# Patient Record
Sex: FEMALE | Race: WHITE | URBAN - METROPOLITAN AREA
[De-identification: names, ages, dates, MRNs, and addresses within clinical notes are randomized per-mention and may not be internally consistent; named-entity substitution may affect disease eponyms.]

---

## 2021-08-04 ENCOUNTER — OFFICE VISIT (OUTPATIENT)
Dept: URBAN - METROPOLITAN AREA CLINIC 105 | Facility: CLINIC | Age: 56
End: 2021-08-04
Payer: COMMERCIAL

## 2021-08-04 ENCOUNTER — TELEPHONE ENCOUNTER (OUTPATIENT)
Dept: URBAN - METROPOLITAN AREA CLINIC 105 | Facility: CLINIC | Age: 56
End: 2021-08-04

## 2021-08-04 ENCOUNTER — WEB ENCOUNTER (OUTPATIENT)
Dept: URBAN - METROPOLITAN AREA CLINIC 105 | Facility: CLINIC | Age: 56
End: 2021-08-04

## 2021-08-04 VITALS
SYSTOLIC BLOOD PRESSURE: 149 MMHG | HEART RATE: 76 BPM | BODY MASS INDEX: 26.16 KG/M2 | TEMPERATURE: 97.5 F | DIASTOLIC BLOOD PRESSURE: 78 MMHG | HEIGHT: 65 IN | WEIGHT: 157 LBS

## 2021-08-04 DIAGNOSIS — R12 HEARTBURN: ICD-10-CM

## 2021-08-04 DIAGNOSIS — K90.0 CELIAC DISEASE: ICD-10-CM

## 2021-08-04 PROCEDURE — 99243 OFF/OP CNSLTJ NEW/EST LOW 30: CPT | Performed by: INTERNAL MEDICINE

## 2021-08-04 RX ORDER — LEVOTHYROXINE SODIUM 125 UG/1
1 TABLET IN THE MORNING ON AN EMPTY STOMACH TABLET ORAL ONCE A DAY
Status: ACTIVE | COMMUNITY

## 2021-08-04 NOTE — HPI-TODAY'S VISIT:
this patient comes on referral from Dr. Inocencia Stapleton.  A copy of the consult will be sent to the referring physician.  Patient did have serologies drawn to evaluate for celiac disease.  Her IgA and IgG anti TTP antibodies were both positive.  Her anti-gliaden Antibodies were also positive. - Pt reports that she has intermittent heartburn. had been on some ibuprofen for joint pain. she was referred to Dr. Myrick who she saw. Had EGD in February noting some irritation in the stomach - no records available. she was started on ppi that caused a rash. - apparently biopsies from the duodenum were suspicious for celiac which led to the serologies.

## 2021-08-05 PROBLEM — 396331005: Status: ACTIVE | Noted: 2021-08-04

## 2021-08-06 ENCOUNTER — TELEPHONE ENCOUNTER (OUTPATIENT)
Dept: URBAN - METROPOLITAN AREA CLINIC 105 | Facility: CLINIC | Age: 56
End: 2021-08-06

## 2021-08-12 ENCOUNTER — OFFICE VISIT (OUTPATIENT)
Dept: URBAN - METROPOLITAN AREA TELEHEALTH 2 | Facility: TELEHEALTH | Age: 56
End: 2021-08-12

## 2021-08-16 ENCOUNTER — OFFICE VISIT (OUTPATIENT)
Dept: URBAN - METROPOLITAN AREA TELEHEALTH 2 | Facility: TELEHEALTH | Age: 56
End: 2021-08-16
Payer: COMMERCIAL

## 2021-08-16 DIAGNOSIS — K90.0 CELIAC DISEASE: ICD-10-CM

## 2021-08-16 PROCEDURE — 97802 MEDICAL NUTRITION INDIV IN: CPT | Performed by: DIETITIAN, REGISTERED

## 2021-08-16 RX ORDER — LEVOTHYROXINE SODIUM 125 UG/1
1 TABLET IN THE MORNING ON AN EMPTY STOMACH TABLET ORAL ONCE A DAY
Status: ACTIVE | COMMUNITY

## 2021-08-17 PROBLEM — 91867008 ADULT CELIAC DISEASE: Status: ACTIVE | Noted: 2021-08-17

## 2021-08-17 PROBLEM — 239838005 CHONDROCALCINOSIS: Status: ACTIVE | Noted: 2021-08-17

## 2021-08-23 ENCOUNTER — OFFICE VISIT (OUTPATIENT)
Dept: URBAN - METROPOLITAN AREA TELEHEALTH 2 | Facility: TELEHEALTH | Age: 56
End: 2021-08-23

## 2021-09-30 ENCOUNTER — TELEPHONE ENCOUNTER (OUTPATIENT)
Dept: URBAN - METROPOLITAN AREA CLINIC 105 | Facility: CLINIC | Age: 56
End: 2021-09-30

## 2021-11-01 ENCOUNTER — OFFICE VISIT (OUTPATIENT)
Dept: URBAN - METROPOLITAN AREA SURGERY CENTER 16 | Facility: SURGERY CENTER | Age: 56
End: 2021-11-01

## 2021-11-29 ENCOUNTER — CLAIMS CREATED FROM THE CLAIM WINDOW (OUTPATIENT)
Dept: URBAN - METROPOLITAN AREA CLINIC 4 | Facility: CLINIC | Age: 56
End: 2021-11-29
Payer: COMMERCIAL

## 2021-11-29 ENCOUNTER — OFFICE VISIT (OUTPATIENT)
Dept: URBAN - METROPOLITAN AREA SURGERY CENTER 16 | Facility: SURGERY CENTER | Age: 56
End: 2021-11-29
Payer: COMMERCIAL

## 2021-11-29 DIAGNOSIS — R12 BURNING REFLUX: ICD-10-CM

## 2021-11-29 DIAGNOSIS — K90.0 ADULT CELIAC DISEASE: ICD-10-CM

## 2021-11-29 DIAGNOSIS — K31.89 DEFORMED PYLORUS, ACQUIRED: ICD-10-CM

## 2021-11-29 PROCEDURE — 88342 IMHCHEM/IMCYTCHM 1ST ANTB: CPT | Performed by: PATHOLOGY

## 2021-11-29 PROCEDURE — G8907 PT DOC NO EVENTS ON DISCHARG: HCPCS | Performed by: INTERNAL MEDICINE

## 2021-11-29 PROCEDURE — 43239 EGD BIOPSY SINGLE/MULTIPLE: CPT | Performed by: INTERNAL MEDICINE

## 2021-11-29 PROCEDURE — 88312 SPECIAL STAINS GROUP 1: CPT | Performed by: PATHOLOGY

## 2021-11-29 PROCEDURE — 88305 TISSUE EXAM BY PATHOLOGIST: CPT | Performed by: PATHOLOGY

## 2021-11-29 RX ORDER — LEVOTHYROXINE SODIUM 125 UG/1
1 TABLET IN THE MORNING ON AN EMPTY STOMACH TABLET ORAL ONCE A DAY
Status: ACTIVE | COMMUNITY

## 2021-11-30 ENCOUNTER — TELEPHONE ENCOUNTER (OUTPATIENT)
Dept: URBAN - METROPOLITAN AREA CLINIC 105 | Facility: CLINIC | Age: 56
End: 2021-11-30

## 2021-12-06 ENCOUNTER — WEB ENCOUNTER (OUTPATIENT)
Dept: URBAN - METROPOLITAN AREA CLINIC 105 | Facility: CLINIC | Age: 56
End: 2021-12-06

## 2021-12-06 ENCOUNTER — TELEPHONE ENCOUNTER (OUTPATIENT)
Dept: URBAN - METROPOLITAN AREA CLINIC 105 | Facility: CLINIC | Age: 56
End: 2021-12-06

## 2022-01-03 ENCOUNTER — WEB ENCOUNTER (OUTPATIENT)
Dept: URBAN - METROPOLITAN AREA CLINIC 105 | Facility: CLINIC | Age: 57
End: 2022-01-03

## 2022-01-04 ENCOUNTER — WEB ENCOUNTER (OUTPATIENT)
Dept: URBAN - METROPOLITAN AREA CLINIC 105 | Facility: CLINIC | Age: 57
End: 2022-01-04

## 2022-07-11 ENCOUNTER — TELEPHONE ENCOUNTER (OUTPATIENT)
Dept: URBAN - METROPOLITAN AREA CLINIC 105 | Facility: CLINIC | Age: 57
End: 2022-07-11

## 2022-08-29 ENCOUNTER — TELEPHONE ENCOUNTER (OUTPATIENT)
Dept: URBAN - METROPOLITAN AREA CLINIC 95 | Facility: CLINIC | Age: 57
End: 2022-08-29

## 2024-02-01 ENCOUNTER — OV EP (OUTPATIENT)
Dept: URBAN - METROPOLITAN AREA CLINIC 105 | Facility: CLINIC | Age: 59
End: 2024-02-01
Payer: COMMERCIAL

## 2024-02-01 VITALS
DIASTOLIC BLOOD PRESSURE: 78 MMHG | SYSTOLIC BLOOD PRESSURE: 126 MMHG | HEART RATE: 64 BPM | BODY MASS INDEX: 27.66 KG/M2 | TEMPERATURE: 97.7 F | HEIGHT: 65 IN | WEIGHT: 166 LBS

## 2024-02-01 DIAGNOSIS — E03.9 ACQUIRED HYPOTHYROIDISM: ICD-10-CM

## 2024-02-01 DIAGNOSIS — R10.31 RLQ ABDOMINAL PAIN: ICD-10-CM

## 2024-02-01 DIAGNOSIS — R93.5 ABNORMAL ABDOMINAL CT SCAN: ICD-10-CM

## 2024-02-01 DIAGNOSIS — K90.0 CELIAC DISEASE: ICD-10-CM

## 2024-02-01 PROBLEM — 111566002: Status: ACTIVE | Noted: 2024-02-01

## 2024-02-01 PROCEDURE — 99213 OFFICE O/P EST LOW 20 MIN: CPT | Performed by: INTERNAL MEDICINE

## 2024-02-01 RX ORDER — LEVOTHYROXINE SODIUM 125 UG/1
1 TABLET IN THE MORNING ON AN EMPTY STOMACH TABLET ORAL ONCE A DAY
COMMUNITY

## 2024-02-01 RX ORDER — BISACODYL 5 MG
TAKE 4 TABLET, DELAYED RELEASE (ENTERIC COATED) ORAL
Qty: 4 | OUTPATIENT
Start: 2024-02-01 | End: 2024-02-02

## 2024-02-01 RX ORDER — SODIUM, POTASSIUM,MAG SULFATES 17.5-3.13G
177 ML SOLUTION, RECONSTITUTED, ORAL ORAL
Qty: 1 KIT | Refills: 0 | OUTPATIENT
Start: 2024-02-01 | End: 2024-02-02

## 2024-02-01 NOTE — HPI-TODAY'S VISIT:
this patient comes on referral from Dr. Inocencia Stapleton.  A copy of the consult will be sent to the referring physician.  Patient did have serologies drawn to evaluate for celiac disease.  Her IgA and IgG anti TTP antibodies were both positive.  Her anti-gliaden Antibodies were also positive. - Pt reports that she has intermittent heartburn. had been on some ibuprofen for joint pain. she was referred to Dr. Myrick who she saw. Had EGD in February noting some irritation in the stomach - no records available. she was started on ppi that caused a rash. - apparently biopsies from the duodenum were suspicious for celiac which led to the serologies. -   02/01/2024: I have not seen this patient since November of 2021 we did upper GI endoscopy for evaluation of positive celiac serologies.  She had been on a gluten free diet and duodenal biopsies were normal histologically.  I provided a CT scan from January 2024 that shows some thickening of the cecum and proximal ascending colon suspicious for inflammation either inflammatory or infectious.- - Pt tells me that she had a week during the night she would have a sharp pain on the lower R side of the abdomen. It lasted for three nights in a row in mid December. this was the reason for the CT scan. since then pain has stopped.  She tells me that her last colonoscopy 5 years ago.

## 2024-03-11 ENCOUNTER — LAB (OUTPATIENT)
Dept: URBAN - METROPOLITAN AREA CLINIC 4 | Facility: CLINIC | Age: 59
End: 2024-03-11
Payer: COMMERCIAL

## 2024-03-11 ENCOUNTER — COLON (OUTPATIENT)
Dept: URBAN - METROPOLITAN AREA SURGERY CENTER 16 | Facility: SURGERY CENTER | Age: 59
End: 2024-03-11
Payer: COMMERCIAL

## 2024-03-11 DIAGNOSIS — D12.0 ADENOMA OF CECUM: ICD-10-CM

## 2024-03-11 DIAGNOSIS — R93.3 ABN FINDINGS-GI TRACT: ICD-10-CM

## 2024-03-11 DIAGNOSIS — D12.5 ADENOMA OF SIGMOID COLON: ICD-10-CM

## 2024-03-11 DIAGNOSIS — R10.31 ABDOMINAL CRAMPING IN RIGHT LOWER QUADRANT: ICD-10-CM

## 2024-03-11 DIAGNOSIS — D12.5 BENIGN NEOPLASM OF SIGMOID COLON: ICD-10-CM

## 2024-03-11 PROCEDURE — 88305 TISSUE EXAM BY PATHOLOGIST: CPT | Performed by: PATHOLOGY

## 2024-03-11 PROCEDURE — 45385 COLONOSCOPY W/LESION REMOVAL: CPT | Performed by: INTERNAL MEDICINE

## 2024-03-11 RX ORDER — LEVOTHYROXINE SODIUM 125 UG/1
1 TABLET IN THE MORNING ON AN EMPTY STOMACH TABLET ORAL ONCE A DAY
COMMUNITY